# Patient Record
Sex: MALE | Race: WHITE | NOT HISPANIC OR LATINO | Employment: OTHER | URBAN - METROPOLITAN AREA
[De-identification: names, ages, dates, MRNs, and addresses within clinical notes are randomized per-mention and may not be internally consistent; named-entity substitution may affect disease eponyms.]

---

## 2020-07-18 ENCOUNTER — HOSPITAL ENCOUNTER (EMERGENCY)
Facility: HOSPITAL | Age: 35
Discharge: HOME | End: 2020-07-18
Admitting: EMERGENCY MEDICINE

## 2020-07-18 ENCOUNTER — APPOINTMENT (OUTPATIENT)
Dept: GENERAL RADIOLOGY | Facility: HOSPITAL | Age: 35
End: 2020-07-18

## 2020-07-18 VITALS
OXYGEN SATURATION: 100 % | DIASTOLIC BLOOD PRESSURE: 78 MMHG | RESPIRATION RATE: 18 BRPM | HEART RATE: 90 BPM | SYSTOLIC BLOOD PRESSURE: 148 MMHG | TEMPERATURE: 98.1 F | HEIGHT: 71 IN | BODY MASS INDEX: 18.86 KG/M2 | WEIGHT: 134.7 LBS

## 2020-07-18 DIAGNOSIS — B34.9 VIRAL SYNDROME: Primary | ICD-10-CM

## 2020-07-18 DIAGNOSIS — Z20.822 EXPOSURE TO COVID-19 VIRUS: ICD-10-CM

## 2020-07-18 DIAGNOSIS — R05.9 COUGH: ICD-10-CM

## 2020-07-18 PROCEDURE — U0004 COV-19 TEST NON-CDC HGH THRU: HCPCS | Performed by: NURSE PRACTITIONER

## 2020-07-18 PROCEDURE — C9803 HOPD COVID-19 SPEC COLLECT: HCPCS

## 2020-07-18 PROCEDURE — 71045 X-RAY EXAM CHEST 1 VIEW: CPT

## 2020-07-18 PROCEDURE — U0002 COVID-19 LAB TEST NON-CDC: HCPCS | Performed by: NURSE PRACTITIONER

## 2020-07-18 PROCEDURE — 99283 EMERGENCY DEPT VISIT LOW MDM: CPT

## 2020-07-20 ENCOUNTER — EPISODE CHANGES (OUTPATIENT)
Dept: CASE MANAGEMENT | Facility: OTHER | Age: 35
End: 2020-07-20

## 2020-07-20 LAB
REF LAB TEST METHOD: NORMAL
SARS-COV-2 RNA RESP QL NAA+PROBE: NOT DETECTED

## 2020-07-21 ENCOUNTER — EPISODE CHANGES (OUTPATIENT)
Dept: CASE MANAGEMENT | Facility: OTHER | Age: 35
End: 2020-07-21

## 2020-07-21 ENCOUNTER — PATIENT OUTREACH (OUTPATIENT)
Dept: CASE MANAGEMENT | Facility: OTHER | Age: 35
End: 2020-07-21

## 2020-07-21 NOTE — OUTREACH NOTE
ED Potential Covid Discharge Follow-up  Talked with patient. Discussed 7/18/20 ED visit regarding viral syndrome and exposure to COVID 19.  Patient awaiting COVID 19 test results. Patient compliant with ED recommendations and under quarantine until receives test results.    Patient reports symptoms of: Improvement regarding cough and has had episodes of SOB.  No difficulty with fever; chest pain; appetite or sleeping.  Patient reports no barriers in obtaining : food; medication and has transportation.    Patient lives with family; independent with ADL's; activities and transportation.   Reviewed with patient: ED recommendations; quarantine education; COVID precautions; hydration;  24/7 Nurse Triage Line; COVID 19 information telephone line; [facility] contact information [phone] (who may contact patient with COVID 19 results); ACM contact information;  My Chart; Telehealth appointment availability and establishing of PCP for follow up. Patient verbalized understanding. He states he will contact [facility] Department for test results and then call PCP for appointment with Dr. Ward Chavez as ED recommended. He declines RN-ACM assistance with PCP scheduling. Patient states to appreciate phone call and no further questions or concerns voiced at this time.     Elisabet Russ RN  Ambulatory     7/21/2020, 12:24

## 2020-07-27 ENCOUNTER — EPISODE CHANGES (OUTPATIENT)
Dept: CASE MANAGEMENT | Facility: OTHER | Age: 35
End: 2020-07-27

## 2020-08-17 ENCOUNTER — HOSPITAL ENCOUNTER (EMERGENCY)
Facility: HOSPITAL | Age: 35
Discharge: HOME | End: 2020-08-17
Admitting: EMERGENCY MEDICINE

## 2020-08-17 ENCOUNTER — APPOINTMENT (OUTPATIENT)
Dept: GENERAL RADIOLOGY | Facility: HOSPITAL | Age: 35
End: 2020-08-17

## 2020-08-17 VITALS
BODY MASS INDEX: 30.96 KG/M2 | OXYGEN SATURATION: 99 % | DIASTOLIC BLOOD PRESSURE: 97 MMHG | HEART RATE: 94 BPM | HEIGHT: 71 IN | WEIGHT: 221.12 LBS | RESPIRATION RATE: 16 BRPM | SYSTOLIC BLOOD PRESSURE: 154 MMHG | TEMPERATURE: 98.3 F

## 2020-08-17 DIAGNOSIS — Z20.822 COVID-19 RULED OUT BY LABORATORY TESTING: ICD-10-CM

## 2020-08-17 DIAGNOSIS — J40 BRONCHITIS: Primary | ICD-10-CM

## 2020-08-17 DIAGNOSIS — R05.9 COUGH: ICD-10-CM

## 2020-08-17 LAB — SARS-COV-2 RNA PNL SPEC NAA+PROBE: NOT DETECTED

## 2020-08-17 PROCEDURE — 93005 ELECTROCARDIOGRAM TRACING: CPT

## 2020-08-17 PROCEDURE — 99283 EMERGENCY DEPT VISIT LOW MDM: CPT

## 2020-08-17 PROCEDURE — 87635 SARS-COV-2 COVID-19 AMP PRB: CPT | Performed by: NURSE PRACTITIONER

## 2020-08-17 PROCEDURE — 71045 X-RAY EXAM CHEST 1 VIEW: CPT

## 2020-08-17 RX ORDER — BROMPHENIRAMINE MALEATE, PSEUDOEPHEDRINE HYDROCHLORIDE, AND DEXTROMETHORPHAN HYDROBROMIDE 2; 30; 10 MG/5ML; MG/5ML; MG/5ML
5 SYRUP ORAL 4 TIMES DAILY PRN
Qty: 120 ML | Refills: 0 | Status: SHIPPED | OUTPATIENT
Start: 2020-08-17 | End: 2020-09-04

## 2020-08-17 RX ORDER — DICLOFENAC SODIUM 75 MG/1
75 TABLET, DELAYED RELEASE ORAL 2 TIMES DAILY PRN
Qty: 20 TABLET | Refills: 0 | OUTPATIENT
Start: 2020-08-17 | End: 2020-09-04

## 2020-08-17 NOTE — ED PROVIDER NOTES
Subjective   Patient is a 35-year-old gentleman who states 3 weeks ago he tested negative for COVID but over the last 48 hours he states he has had cough and congestion he states that the cough is been so bad that he has had pain into his back.  He denies any hemoptysis.  He denies any fever or chills nausea or vomiting.  He states he is not been around anyone with COVID that he is aware of.  He rates his pain a 6/10 he states it is a dull aching pain and mainly only when he coughs.  He states he has had some productive yellow sputum over the last 12 hours          Review of Systems   Constitutional: Negative for chills, fatigue and fever.   HENT: Positive for congestion. Negative for tinnitus and trouble swallowing.    Eyes: Negative for photophobia, discharge and redness.   Respiratory: Positive for cough. Negative for shortness of breath.    Cardiovascular: Positive for chest pain. Negative for palpitations.   Gastrointestinal: Negative for abdominal pain, diarrhea, nausea and vomiting.   Genitourinary: Negative for dysuria, frequency and urgency.   Musculoskeletal: Negative for back pain, joint swelling and myalgias.   Skin: Negative for rash.   Neurological: Negative for dizziness and headaches.   Psychiatric/Behavioral: Negative for confusion.   All other systems reviewed and are negative.      History reviewed. No pertinent past medical history.    Allergies   Allergen Reactions   • Penicillins Swelling       History reviewed. No pertinent surgical history.    No family history on file.    Social History     Socioeconomic History   • Marital status: Single     Spouse name: Not on file   • Number of children: Not on file   • Years of education: Not on file   • Highest education level: Not on file           Objective   Physical Exam   Constitutional: He is oriented to person, place, and time. He appears well-developed and well-nourished.   HENT:   Head: Normocephalic and atraumatic.   Eyes: Pupils are equal,  "round, and reactive to light. Conjunctivae and EOM are normal.   Neck: Normal range of motion. Neck supple.   Cardiovascular: Normal rate, regular rhythm, normal heart sounds and intact distal pulses.   Pulmonary/Chest: Effort normal and breath sounds normal. He has no decreased breath sounds. He has no wheezes.   Abdominal: Soft. Bowel sounds are normal.   Musculoskeletal: Normal range of motion.   Neurological: He is alert and oriented to person, place, and time. GCS eye subscore is 4. GCS verbal subscore is 5. GCS motor subscore is 6.   Skin: Skin is warm and dry. Capillary refill takes less than 2 seconds.   Psychiatric: He has a normal mood and affect. His behavior is normal.   Vitals reviewed.      Procedures           ED Course      /97 (Patient Position: Sitting)   Pulse 94   Temp 98.1 °F (36.7 °C) (Oral)   Resp 18   Ht 180.3 cm (71\")   Wt 100 kg (221 lb 1.9 oz)   SpO2 100%   BMI 30.84 kg/m²   Labs Reviewed   COVID-19,ABBOTT IN-HOUSE,NP SWAB (NO TRANSPORT MEDIA) 2 HR TAT - Normal    Narrative:     Fact sheet for providers: https://www.fda.gov/media/048784/download     Fact sheet for patients: https://www.fda.gov/media/722805/download     Medications - No data to display       Chest x-ray was reviewed and found to be within normal limits                                MDM  Number of Diagnoses or Management Options  Bronchitis:   Cough:   COVID-19 ruled out by laboratory testing:   Diagnosis management comments: Patient had above exam and chest x-ray and COVID 19 testing was performed.  Patient was offered pain medication which he states he does not want.    Patient's COVID was negative again today and chest x-ray was within normal limits.  Patient will be sent home with some Bromfed and diclofenac and told to follow-up with primary care for any further problems return if worse patient verbalized understood discharge instructions       Amount and/or Complexity of Data Reviewed  Clinical lab tests: " reviewed  Tests in the radiology section of CPT®: reviewed  Tests in the medicine section of CPT®: reviewed  Independent visualization of images, tracings, or specimens: yes    Patient Progress  Patient progress: stable      Final diagnoses:   Bronchitis   Cough   COVID-19 ruled out by laboratory testing            Radha Strickland, APRN  08/17/20 0247

## 2020-08-17 NOTE — DISCHARGE INSTRUCTIONS
Use diclofenac as needed for discomfort    Bromfed for cough and congestion    Follow-up with primary care for any further problems return if worse    May also use Tylenol Motrin as needed

## 2020-09-05 ENCOUNTER — APPOINTMENT (OUTPATIENT)
Dept: GENERAL RADIOLOGY | Facility: HOSPITAL | Age: 35
End: 2020-09-05

## 2020-09-05 ENCOUNTER — APPOINTMENT (OUTPATIENT)
Dept: CT IMAGING | Facility: HOSPITAL | Age: 35
End: 2020-09-05

## 2020-09-05 ENCOUNTER — HOSPITAL ENCOUNTER (EMERGENCY)
Facility: HOSPITAL | Age: 35
Discharge: HOME | End: 2020-09-05
Admitting: EMERGENCY MEDICINE

## 2020-09-05 VITALS
RESPIRATION RATE: 16 BRPM | WEIGHT: 220.46 LBS | TEMPERATURE: 98.2 F | HEIGHT: 71 IN | BODY MASS INDEX: 30.86 KG/M2 | OXYGEN SATURATION: 98 % | SYSTOLIC BLOOD PRESSURE: 131 MMHG | DIASTOLIC BLOOD PRESSURE: 81 MMHG | HEART RATE: 88 BPM

## 2020-09-05 DIAGNOSIS — R10.13 EPIGASTRIC PAIN: Primary | ICD-10-CM

## 2020-09-05 DIAGNOSIS — R05.9 COUGH: ICD-10-CM

## 2020-09-05 DIAGNOSIS — R11.0 NAUSEA: ICD-10-CM

## 2020-09-05 DIAGNOSIS — K21.9 GASTROESOPHAGEAL REFLUX DISEASE, ESOPHAGITIS PRESENCE NOT SPECIFIED: ICD-10-CM

## 2020-09-05 LAB
ALBUMIN SERPL-MCNC: 4.8 G/DL (ref 3.5–5.2)
ALBUMIN/GLOB SERPL: 1.9 G/DL
ALP SERPL-CCNC: 127 U/L (ref 39–117)
ALT SERPL W P-5'-P-CCNC: 41 U/L (ref 1–41)
ANION GAP SERPL CALCULATED.3IONS-SCNC: 10 MMOL/L (ref 5–15)
AST SERPL-CCNC: 24 U/L (ref 1–40)
BASOPHILS # BLD AUTO: 0.1 10*3/MM3 (ref 0–0.2)
BASOPHILS NFR BLD AUTO: 0.6 % (ref 0–1.5)
BILIRUB SERPL-MCNC: 0.5 MG/DL (ref 0–1.2)
BILIRUB UR QL STRIP: NEGATIVE
BUN SERPL-MCNC: 12 MG/DL (ref 6–20)
BUN SERPL-MCNC: ABNORMAL MG/DL
BUN/CREAT SERPL: ABNORMAL
CALCIUM SPEC-SCNC: 8.9 MG/DL (ref 8.6–10.5)
CHLORIDE SERPL-SCNC: 101 MMOL/L (ref 98–107)
CLARITY UR: CLEAR
CO2 SERPL-SCNC: 27 MMOL/L (ref 22–29)
COLOR UR: YELLOW
CREAT SERPL-MCNC: 0.85 MG/DL (ref 0.76–1.27)
DEPRECATED RDW RBC AUTO: 40.3 FL (ref 37–54)
EOSINOPHIL # BLD AUTO: 0.2 10*3/MM3 (ref 0–0.4)
EOSINOPHIL NFR BLD AUTO: 2.4 % (ref 0.3–6.2)
ERYTHROCYTE [DISTWIDTH] IN BLOOD BY AUTOMATED COUNT: 12.5 % (ref 12.3–15.4)
GFR SERPL CREATININE-BSD FRML MDRD: 101 ML/MIN/1.73
GLOBULIN UR ELPH-MCNC: 2.5 GM/DL
GLUCOSE SERPL-MCNC: 97 MG/DL (ref 65–99)
GLUCOSE UR STRIP-MCNC: NEGATIVE MG/DL
HCT VFR BLD AUTO: 45.6 % (ref 37.5–51)
HGB BLD-MCNC: 16.3 G/DL (ref 13–17.7)
HGB UR QL STRIP.AUTO: NEGATIVE
HOLD SPECIMEN: NORMAL
HOLD SPECIMEN: NORMAL
KETONES UR QL STRIP: NEGATIVE
LEUKOCYTE ESTERASE UR QL STRIP.AUTO: NEGATIVE
LIPASE SERPL-CCNC: 26 U/L (ref 13–60)
LYMPHOCYTES # BLD AUTO: 2.6 10*3/MM3 (ref 0.7–3.1)
LYMPHOCYTES NFR BLD AUTO: 27 % (ref 19.6–45.3)
MCH RBC QN AUTO: 33.2 PG (ref 26.6–33)
MCHC RBC AUTO-ENTMCNC: 35.8 G/DL (ref 31.5–35.7)
MCV RBC AUTO: 92.8 FL (ref 79–97)
MONOCYTES # BLD AUTO: 0.9 10*3/MM3 (ref 0.1–0.9)
MONOCYTES NFR BLD AUTO: 9 % (ref 5–12)
NEUTROPHILS NFR BLD AUTO: 6 10*3/MM3 (ref 1.7–7)
NEUTROPHILS NFR BLD AUTO: 61 % (ref 42.7–76)
NITRITE UR QL STRIP: NEGATIVE
NRBC BLD AUTO-RTO: 0 /100 WBC (ref 0–0.2)
PH UR STRIP.AUTO: 8 [PH] (ref 5–8)
PLATELET # BLD AUTO: 342 10*3/MM3 (ref 140–450)
PMV BLD AUTO: 6.7 FL (ref 6–12)
POTASSIUM SERPL-SCNC: 3.9 MMOL/L (ref 3.5–5.2)
PROT SERPL-MCNC: 7.3 G/DL (ref 6–8.5)
PROT UR QL STRIP: ABNORMAL
RBC # BLD AUTO: 4.91 10*6/MM3 (ref 4.14–5.8)
SODIUM SERPL-SCNC: 138 MMOL/L (ref 136–145)
SP GR UR STRIP: >=1.03 (ref 1–1.03)
TROPONIN T SERPL-MCNC: <0.01 NG/ML (ref 0–0.03)
UROBILINOGEN UR QL STRIP: ABNORMAL
WBC # BLD AUTO: 9.8 10*3/MM3 (ref 3.4–10.8)
WHOLE BLOOD HOLD SPECIMEN: NORMAL
WHOLE BLOOD HOLD SPECIMEN: NORMAL

## 2020-09-05 PROCEDURE — 93005 ELECTROCARDIOGRAM TRACING: CPT | Performed by: NURSE PRACTITIONER

## 2020-09-05 PROCEDURE — 99283 EMERGENCY DEPT VISIT LOW MDM: CPT

## 2020-09-05 PROCEDURE — 0 IOPAMIDOL PER 1 ML: Performed by: NURSE PRACTITIONER

## 2020-09-05 PROCEDURE — 84484 ASSAY OF TROPONIN QUANT: CPT | Performed by: NURSE PRACTITIONER

## 2020-09-05 PROCEDURE — 83690 ASSAY OF LIPASE: CPT | Performed by: NURSE PRACTITIONER

## 2020-09-05 PROCEDURE — 81003 URINALYSIS AUTO W/O SCOPE: CPT | Performed by: NURSE PRACTITIONER

## 2020-09-05 PROCEDURE — 74177 CT ABD & PELVIS W/CONTRAST: CPT

## 2020-09-05 PROCEDURE — 80053 COMPREHEN METABOLIC PANEL: CPT | Performed by: NURSE PRACTITIONER

## 2020-09-05 PROCEDURE — 71045 X-RAY EXAM CHEST 1 VIEW: CPT

## 2020-09-05 PROCEDURE — 96374 THER/PROPH/DIAG INJ IV PUSH: CPT

## 2020-09-05 PROCEDURE — 85025 COMPLETE CBC W/AUTO DIFF WBC: CPT | Performed by: NURSE PRACTITIONER

## 2020-09-05 RX ORDER — SODIUM CHLORIDE 0.9 % (FLUSH) 0.9 %
10 SYRINGE (ML) INJECTION AS NEEDED
Status: DISCONTINUED | OUTPATIENT
Start: 2020-09-05 | End: 2020-09-05 | Stop reason: HOSPADM

## 2020-09-05 RX ORDER — PANTOPRAZOLE SODIUM 40 MG/10ML
80 INJECTION, POWDER, LYOPHILIZED, FOR SOLUTION INTRAVENOUS ONCE
Status: COMPLETED | OUTPATIENT
Start: 2020-09-05 | End: 2020-09-05

## 2020-09-05 RX ADMIN — IOPAMIDOL 100 ML: 755 INJECTION, SOLUTION INTRAVENOUS at 15:40

## 2020-09-05 RX ADMIN — PANTOPRAZOLE SODIUM 80 MG: 40 INJECTION, POWDER, FOR SOLUTION INTRAVENOUS at 15:02

## 2020-09-05 NOTE — DISCHARGE INSTRUCTIONS
Patient to try over-the-counter omeprazole use as directed on the bottle he is to follow-up with his family doctor and get referred to GI specialist

## 2020-09-05 NOTE — ED PROVIDER NOTES
Subjective   Patient is a 35-year-old white male comes in with complaints of midepigastric abdominal pain goes to the right side flank area denies any urinary symptoms states bowels are moving okay states was seen by the immediate care and was placed on doxycycline but has not started this for cough coughing up brown stuff denies any fever states he gets worse when he lays down and gets a little dizzy he is allergic to penicillin no fever      History provided by:  Patient  Abdominal Pain   Pain location:  Epigastric and R flank  Pain quality: aching    Pain radiates to:  R flank  Pain severity:  Moderate  Onset quality:  Gradual  Duration:  1 week  Timing:  Constant  Progression:  Worsening  Chronicity:  New  Context: recent illness    Context: not alcohol use, not awakening from sleep, not diet changes, not eating, not laxative use, not medication withdrawal, not previous surgeries, not retching, not sick contacts, not suspicious food intake and not trauma    Relieved by:  Nothing  Worsened by:  Palpation, movement and position changes  Ineffective treatments:  NSAIDs  Associated symptoms: chest pain, cough, nausea and shortness of breath    Associated symptoms: no anorexia, no belching, no chills, no constipation, no diarrhea, no dysuria, no fatigue, no fever, no flatus, no hematemesis, no hematochezia, no hematuria, no melena, no sore throat and no vomiting    Cough:     Cough characteristics:  Productive    Sputum characteristics:  Green and brown    Severity:  Mild    Onset quality:  Gradual  Risk factors: NSAID use and obesity        Review of Systems   Constitutional: Negative for activity change, appetite change, chills, fatigue and fever.   HENT: Negative for congestion, sore throat and trouble swallowing.    Eyes: Negative for discharge and redness.   Respiratory: Positive for cough and shortness of breath. Negative for apnea, chest tightness and stridor.    Cardiovascular: Positive for chest pain.  Negative for palpitations and leg swelling.   Gastrointestinal: Positive for abdominal pain and nausea. Negative for abdominal distention, anal bleeding, anorexia, blood in stool, constipation, diarrhea, flatus, hematemesis, hematochezia, melena and vomiting.   Genitourinary: Positive for flank pain. Negative for dysuria and hematuria.   Musculoskeletal: Negative for back pain, joint swelling and neck pain.   Skin: Negative for color change, pallor and rash.   Neurological: Negative for dizziness and numbness.   Psychiatric/Behavioral: The patient is nervous/anxious.        History reviewed. No pertinent past medical history.    Allergies   Allergen Reactions   • Penicillins Swelling       History reviewed. No pertinent surgical history.    No family history on file.    Social History     Socioeconomic History   • Marital status: Single     Spouse name: Not on file   • Number of children: Not on file   • Years of education: Not on file   • Highest education level: Not on file           Objective   Physical Exam   Constitutional: He is oriented to person, place, and time. He appears well-developed and well-nourished. No distress.   HENT:   Head: Normocephalic and atraumatic.   Eyes: Pupils are equal, round, and reactive to light. Conjunctivae and EOM are normal.   Neck: Normal range of motion. Neck supple.   Cardiovascular: Normal rate, regular rhythm, normal heart sounds and intact distal pulses. Exam reveals no gallop and no friction rub.   No murmur heard.  Pulmonary/Chest: Effort normal and breath sounds normal. No respiratory distress. He has no wheezes. He has no rales. He exhibits no tenderness.   Abdominal: Soft. Bowel sounds are normal. He exhibits no distension. There is no hepatosplenomegaly. There is tenderness in the right upper quadrant, epigastric area and periumbilical area. There is tenderness at McBurney's point. There is no rigidity, no rebound, no guarding, no CVA tenderness and negative Lr's  sign.   Musculoskeletal: Normal range of motion.   Neurological: He is alert and oriented to person, place, and time.   Skin: Skin is warm and dry. No rash noted. He is not diaphoretic.   Psychiatric: He has a normal mood and affect. His behavior is normal. Judgment and thought content normal.   Nursing note and vitals reviewed.      Procedures           ED Course  ED Course as of Sep 05 1622   Sat Sep 05, 2020   1502 CBC white count normal CMP normal urine normal lipase normal troponin 0.0 1L normal    []   1504 EKG read by  normal sinus rhythm no acute changes    []   1618 CT scan of abdomen normal    []      ED Course User Index  [] Марина Villanueva, TATUM      Ct Abdomen Pelvis With Contrast    Result Date: 9/5/2020  No acute findings.  Electronically Signed By-Michele Muller On:9/5/2020 4:15 PM This report was finalized on [id number] by  Michele Muller, .    Xr Chest 1 View    Result Date: 9/5/2020  No active disease.  Electronically Signed By-Michele Muller On:9/5/2020 3:09 PM This report was finalized on [id number] by  Michele Muller, .    Medications   sodium chloride 0.9 % flush 10 mL (has no administration in time range)   pantoprazole (PROTONIX) injection 80 mg (80 mg Intravenous Given 9/5/20 1502)   iopamidol (ISOVUE-370) 76 % injection 100 mL (100 mL Intravenous Given 9/5/20 1540)     Labs Reviewed   COMPREHENSIVE METABOLIC PANEL - Abnormal; Notable for the following components:       Result Value    Alkaline Phosphatase 127 (*)     All other components within normal limits    Narrative:     GFR Normal >60  Chronic Kidney Disease <60  Kidney Failure <15     URINALYSIS W/ MICROSCOPIC IF INDICATED (NO CULTURE) - Abnormal; Notable for the following components:    Protein, UA Trace (*)     All other components within normal limits    Narrative:     Urine microscopic not indicated.   CBC WITH AUTO DIFFERENTIAL - Abnormal; Notable for the following components:    MCH 33.2 (*)     MCHC 35.8 (*)     All  other components within normal limits   LIPASE - Normal   TROPONIN (IN-HOUSE) - Normal    Narrative:     Troponin T Reference Range:  <= 0.03 ng/mL-   Negative for AMI  >0.03 ng/mL-     Abnormal for myocardial necrosis.  Clinicians would have to utilize clinical acumen, EKG, Troponin and serial changes to determine if it is an Acute Myocardial Infarction or myocardial injury due to an underlying chronic condition.       Results may be falsely decreased if patient taking Biotin.     BUN - Normal   RAINBOW DRAW    Narrative:     The following orders were created for panel order [place] Draw.  Procedure                               Abnormality         Status                     ---------                               -----------         ------                     Light Blue Top[[id number]]                                   Final result               Green Top (Gel)[[id number]]                                  Final result               Lavender Top[[id number]]                                     Final result               Gold Top - SST[[id number]]                                   Final result                 Please view results for these tests on the individual orders.   CBC AND DIFFERENTIAL    Narrative:     The following orders were created for panel order CBC & Differential.  Procedure                               Abnormality         Status                     ---------                               -----------         ------                     CBC Auto Differential[[id number]]        Abnormal            Final result                 Please view results for these tests on the individual orders.   LIGHT BLUE TOP   GREEN TOP   LAVENDER TOP   GOLD TOP - SST                                          MDM  Number of Diagnoses or Management Options  Cough:   Epigastric pain:   Gastroesophageal reflux disease, esophagitis presence not specified:   Nausea:   Diagnosis management comments: Disposition: Discharged.    Patient  discharged in stable condition.    Reviewed implications of results, diagnosis, meds, responsibility to follow up, warning signs and symptoms of possible worsening, potential complications and reasons to return to ER    Patient/Family voiced understanding of above instructions.    Discussed plan for discharge, as there is no emergent indication for admission.  Pt/family is agreeable and understands need for follow up and repeat testing.  Pt is aware that discharge does not mean that nothing is wrong but it indicates no emergency is present and they must continue care with follow-up as given below or physician of their choice.     FOLLOW-UP  Moiz Arambula  [facility] [address] [phone] an appointment as soon as possible for a visit If symptoms worsen  [facility] EMERGENCY [id number] [place] [address] [phone] symptoms worsen       Medication List      No changes were made to your prescriptions during this visit.            Amount and/or Complexity of Data Reviewed  Clinical lab tests: reviewed  Tests in the radiology section of CPT®: reviewed  Tests in the medicine section of CPT®: reviewed        Final diagnoses:   Epigastric pain   Nausea   Cough   Gastroesophageal reflux disease, esophagitis presence not specified            Марина Villanueva, APRN  09/05/20 5366

## 2020-10-20 ENCOUNTER — OFFICE VISIT (OUTPATIENT)
Dept: FAMILY MEDICINE CLINIC | Facility: CLINIC | Age: 35
End: 2020-10-20

## 2020-10-20 VITALS
SYSTOLIC BLOOD PRESSURE: 124 MMHG | WEIGHT: 226 LBS | DIASTOLIC BLOOD PRESSURE: 88 MMHG | HEIGHT: 71 IN | HEART RATE: 95 BPM | TEMPERATURE: 96.6 F | RESPIRATION RATE: 16 BRPM | OXYGEN SATURATION: 97 % | BODY MASS INDEX: 31.64 KG/M2

## 2020-10-20 DIAGNOSIS — R10.13 EPIGASTRIC PAIN: Primary | ICD-10-CM

## 2020-10-20 DIAGNOSIS — R13.19 ESOPHAGEAL DYSPHAGIA: ICD-10-CM

## 2020-10-20 PROCEDURE — 99204 OFFICE O/P NEW MOD 45 MIN: CPT | Performed by: PHYSICIAN ASSISTANT

## 2020-10-20 RX ORDER — PANTOPRAZOLE SODIUM 40 MG/1
40 TABLET, DELAYED RELEASE ORAL DAILY
Qty: 45 TABLET | Refills: 0 | Status: SHIPPED | OUTPATIENT
Start: 2020-10-20 | End: 2022-10-10

## 2020-10-20 NOTE — PROGRESS NOTES
Rooming Tab(CC,VS,Pt Hx,Fall Screen)  Chief Complaint   Patient presents with   • Abdominal Pain       Subjective  Patient presents to the clinic with complaints of epigastric abdominal pain that has been present over the past 1 to 2 weeks.  He has complained of abdominal bloating that has been present over the past 1 year.  He denies having any vomiting, fevers, diarrhea associated with the abdominal pain.  He did go to the emergency department and had a CT scan performed that did not show any acute process.  He had labs that were taken there revealed a slightly elevated alkaline phosphatase but other than that his labs were unremarkable.  He denies having pain like this in the past.  He denies dark-colored stools.  He does not notice any association with food intake.  He does take ibuprofen on a daily basis and has been doing this for quite some time for joint pain and headaches.  He only drinks 1-2 beers per month.  He is an everyday smoker.  No history of pancreatic issues.  No family history of pancreatic or stomach cancer.  He also complains that on occasion he will feel like his food gets stuck in his esophagus and he is to drink extra water to get this to go down.    I have reviewed and updated his medications, medical history and problem list during today's office visit.     Patient Care Team:  Jc Khan PA-C as PCP - General (Physician Assistant)  David Martinez MD as PCP - Claims Attributed    Problem List Tab  Medications Tab  Synopsis Tab  Chart Review Tab  Care Everywhere Tab  Immunizations Tab  Patient History Tab    Social History     Tobacco Use   • Smoking status: Current Every Day Smoker   • Smokeless tobacco: Never Used   Substance Use Topics   • Alcohol use: Yes       Review of Systems   Constitutional: Negative for activity change, appetite change and fatigue.   HENT: Negative for congestion, rhinorrhea and sore throat.    Eyes: Negative for blurred vision, pain and visual  "disturbance.   Respiratory: Negative for cough, chest tightness and shortness of breath.    Cardiovascular: Negative for chest pain and leg swelling.   Gastrointestinal: Positive for abdominal distention and abdominal pain. Negative for blood in stool and nausea.   Endocrine: Negative for polydipsia and polyuria.   Genitourinary: Negative for dysuria and urgency.   Musculoskeletal: Negative for arthralgias and back pain.   Skin: Negative for rash and bruise.   Allergic/Immunologic: Negative for environmental allergies.   Neurological: Negative for headache and confusion.   Hematological: Negative for adenopathy. Does not bruise/bleed easily.   Psychiatric/Behavioral: Negative for stress. The patient is not nervous/anxious.        Objective     Rooming Tab(CC,VS,Pt Hx,Fall Screen)  /88 (BP Location: Right arm)   Pulse 95   Temp 96.6 °F (35.9 °C)   Resp 16   Ht 180.3 cm (71\")   Wt 103 kg (226 lb)   SpO2 97%   BMI 31.52 kg/m²     Body mass index is 31.52 kg/m².    Physical Exam  Constitutional:       Appearance: He is well-developed.   HENT:      Head: Normocephalic and atraumatic.   Eyes:      Pupils: Pupils are equal, round, and reactive to light.   Neck:      Musculoskeletal: Normal range of motion and neck supple.   Cardiovascular:      Rate and Rhythm: Normal rate and regular rhythm.      Heart sounds: No murmur.   Pulmonary:      Effort: Pulmonary effort is normal.      Breath sounds: Normal breath sounds.   Abdominal:      General: Bowel sounds are normal.      Palpations: Abdomen is soft.      Tenderness: There is abdominal tenderness (pain in the epigastric region).   Musculoskeletal: Normal range of motion.         General: No deformity.   Lymphadenopathy:      Cervical: No cervical adenopathy.   Skin:     General: Skin is warm and dry.      Capillary Refill: Capillary refill takes less than 2 seconds.   Neurological:      Mental Status: He is alert and oriented to person, place, and time.      " Cranial Nerves: No cranial nerve deficit.   Psychiatric:         Behavior: Behavior normal.          Statin Choice Calculator  Data Reviewed:             Lab Results   Component Value Date    BUN  09/05/2020      Comment:      Testing performed by alternate method    BUN 12 09/05/2020    CREATININE 0.85 09/05/2020    EGFRIFNONA 101 09/05/2020     09/05/2020    K 3.9 09/05/2020     09/05/2020    CALCIUM 8.9 09/05/2020    ALBUMIN 4.80 09/05/2020    BILITOT 0.5 09/05/2020    ALKPHOS 127 (H) 09/05/2020    AST 24 09/05/2020    ALT 41 09/05/2020    WBC 9.80 09/05/2020    RBC 4.91 09/05/2020    HCT 45.6 09/05/2020    MCV 92.8 09/05/2020    MCH 33.2 (H) 09/05/2020      Assessment/Plan   Order Review Tab  Health Maintenance Tab  Patient Plan/Order Tab  Diagnoses and all orders for this visit:    1. Epigastric pain (Primary)  -     Ambulatory Referral to Gastroenterology    2. Esophageal dysphagia  -     Ambulatory Referral to Gastroenterology    Other orders  -     pantoprazole (Protonix) 40 MG EC tablet; Take 1 tablet by mouth Daily.  Dispense: 45 tablet; Refill: 0        Wrapup Tab  Return in about 3 months (around 1/20/2021), or if symptoms worsen or fail to improve, for Recheck.

## 2021-04-18 ENCOUNTER — APPOINTMENT (OUTPATIENT)
Dept: GENERAL RADIOLOGY | Facility: HOSPITAL | Age: 36
End: 2021-04-18

## 2021-04-18 ENCOUNTER — HOSPITAL ENCOUNTER (EMERGENCY)
Facility: HOSPITAL | Age: 36
Discharge: HOME | End: 2021-04-19
Attending: EMERGENCY MEDICINE | Admitting: EMERGENCY MEDICINE

## 2021-04-18 VITALS
SYSTOLIC BLOOD PRESSURE: 165 MMHG | BODY MASS INDEX: 32.56 KG/M2 | HEART RATE: 115 BPM | WEIGHT: 232.59 LBS | RESPIRATION RATE: 18 BRPM | HEIGHT: 71 IN | TEMPERATURE: 98 F | OXYGEN SATURATION: 100 % | DIASTOLIC BLOOD PRESSURE: 70 MMHG

## 2021-04-18 DIAGNOSIS — R07.89 CHEST WALL PAIN: Primary | ICD-10-CM

## 2021-04-18 PROCEDURE — 99282 EMERGENCY DEPT VISIT SF MDM: CPT

## 2021-04-18 PROCEDURE — 71046 X-RAY EXAM CHEST 2 VIEWS: CPT

## 2021-04-19 NOTE — ED PROVIDER NOTES
Subjective   36-year-old male was swinging a 2 x 4 around 4 PM and felt a catch in his right inferior lateral ribs.  There is no blunt trauma but patient has had pain with movement and deep breath that has been moderate in intensity since this event.  Patient has a mild associated cough no fever or shortness of air.          Review of Systems   Constitutional: Negative.    Respiratory: Positive for cough.    Cardiovascular: Positive for chest pain.   Neurological: Negative.        Past Medical History:   Diagnosis Date   • Anxiety        Allergies   Allergen Reactions   • Penicillins Swelling       Past Surgical History:   Procedure Laterality Date   • TESTICLE SURGERY Right        Family History   Problem Relation Age of Onset   • COPD Mother    • Heart failure Mother    • Arthritis Mother    • COPD Father    • Heart failure Father    • Heart disease Sister        Social History     Socioeconomic History   • Marital status: Single     Spouse name: Not on file   • Number of children: Not on file   • Years of education: Not on file   • Highest education level: Not on file   Tobacco Use   • Smoking status: Current Every Day Smoker   • Smokeless tobacco: Never Used   Substance and Sexual Activity   • Alcohol use: Yes   • Drug use: Never           Objective   Physical Exam  Constitutional:       Appearance: Normal appearance.   HENT:      Head: Normocephalic and atraumatic.   Cardiovascular:      Rate and Rhythm: Normal rate and regular rhythm.      Heart sounds: Normal heart sounds.   Pulmonary:      Effort: Pulmonary effort is normal.      Breath sounds: Normal breath sounds.      Comments: There is mild rib tenderness right lateral inferior costal margin, no overlying crepitus or abrasion or ecchymosis  Abdominal:      General: Bowel sounds are normal. There is no distension.      Palpations: Abdomen is soft.      Tenderness: There is no abdominal tenderness.   Musculoskeletal:         General: No tenderness. Normal  range of motion.   Skin:     General: Skin is warm and dry.      Capillary Refill: Capillary refill takes less than 2 seconds.   Neurological:      General: No focal deficit present.      Mental Status: He is alert and oriented to person, place, and time.   Psychiatric:         Mood and Affect: Mood normal.         Behavior: Behavior normal.         Procedures           ED Course                                           MDM    Final diagnoses:   Chest wall pain       ED Disposition  ED Disposition     ED Disposition Condition Comment    Discharge Stable           Jc Khan PA-C  [address] [facility] [address]  [facility] [address]  [place] [address]  [phone]      As needed         Medication List      No changes were made to your prescriptions during this visit.          Indio Dudley MD  04/19/21 0046

## 2021-04-19 NOTE — ED NOTES
Pt reports swinging boards over his head earlier today and experienced a sharp pain to right lower chest/ upper abdominal area. Painful to use right arm over the head, coughing, deep breathing. Pt concerned because he has had abdominal issues and is scheduled for an EGD.     Alma Rosa Jones, RN  04/18/21 9622

## 2021-05-18 PROBLEM — H81.10 BENIGN PAROXYSMAL POSITIONAL VERTIGO: Status: ACTIVE | Noted: 2019-01-07

## 2021-05-18 PROBLEM — H69.81 EUSTACHIAN TUBE DYSFUNCTION, RIGHT: Status: ACTIVE | Noted: 2019-01-07

## 2021-05-18 PROBLEM — J20.9 ACUTE BRONCHITIS WITH BRONCHOSPASM: Status: ACTIVE | Noted: 2017-04-05

## 2021-05-18 PROBLEM — J32.9 SINUSITIS: Status: ACTIVE | Noted: 2019-01-07

## 2021-05-18 PROBLEM — H69.91 EUSTACHIAN TUBE DYSFUNCTION, RIGHT: Status: ACTIVE | Noted: 2019-01-07

## 2021-05-26 ENCOUNTER — HOSPITAL ENCOUNTER (EMERGENCY)
Facility: HOSPITAL | Age: 36
Discharge: HOME | End: 2021-05-26
Attending: EMERGENCY MEDICINE | Admitting: EMERGENCY MEDICINE

## 2021-05-26 ENCOUNTER — APPOINTMENT (OUTPATIENT)
Dept: GENERAL RADIOLOGY | Facility: HOSPITAL | Age: 36
End: 2021-05-26

## 2021-05-26 VITALS
HEART RATE: 72 BPM | SYSTOLIC BLOOD PRESSURE: 119 MMHG | TEMPERATURE: 98.1 F | WEIGHT: 225.09 LBS | OXYGEN SATURATION: 94 % | RESPIRATION RATE: 16 BRPM | BODY MASS INDEX: 31.51 KG/M2 | HEIGHT: 71 IN | DIASTOLIC BLOOD PRESSURE: 60 MMHG

## 2021-05-26 DIAGNOSIS — R07.9 CHEST PAIN, UNSPECIFIED TYPE: ICD-10-CM

## 2021-05-26 DIAGNOSIS — J20.9 ACUTE BRONCHITIS, UNSPECIFIED ORGANISM: Primary | ICD-10-CM

## 2021-05-26 LAB
ANION GAP SERPL CALCULATED.3IONS-SCNC: 14 MMOL/L (ref 5–15)
BASOPHILS # BLD AUTO: 0 10*3/MM3 (ref 0–0.2)
BASOPHILS NFR BLD AUTO: 0.5 % (ref 0–1.5)
BUN SERPL-MCNC: 12 MG/DL (ref 6–20)
BUN/CREAT SERPL: 14.3 (ref 7–25)
CALCIUM SPEC-SCNC: 8.6 MG/DL (ref 8.6–10.5)
CHLORIDE SERPL-SCNC: 104 MMOL/L (ref 98–107)
CO2 SERPL-SCNC: 22 MMOL/L (ref 22–29)
CREAT SERPL-MCNC: 0.84 MG/DL (ref 0.76–1.27)
DEPRECATED RDW RBC AUTO: 41.1 FL (ref 37–54)
EOSINOPHIL # BLD AUTO: 0.2 10*3/MM3 (ref 0–0.4)
EOSINOPHIL NFR BLD AUTO: 2.8 % (ref 0.3–6.2)
ERYTHROCYTE [DISTWIDTH] IN BLOOD BY AUTOMATED COUNT: 12.6 % (ref 12.3–15.4)
GFR SERPL CREATININE-BSD FRML MDRD: 102 ML/MIN/1.73
GLUCOSE SERPL-MCNC: 123 MG/DL (ref 65–99)
HCT VFR BLD AUTO: 43.3 % (ref 37.5–51)
HGB BLD-MCNC: 15.3 G/DL (ref 13–17.7)
LYMPHOCYTES # BLD AUTO: 2.5 10*3/MM3 (ref 0.7–3.1)
LYMPHOCYTES NFR BLD AUTO: 28.8 % (ref 19.6–45.3)
MCH RBC QN AUTO: 33 PG (ref 26.6–33)
MCHC RBC AUTO-ENTMCNC: 35.3 G/DL (ref 31.5–35.7)
MCV RBC AUTO: 93.4 FL (ref 79–97)
MONOCYTES # BLD AUTO: 0.7 10*3/MM3 (ref 0.1–0.9)
MONOCYTES NFR BLD AUTO: 8.1 % (ref 5–12)
NEUTROPHILS NFR BLD AUTO: 5.3 10*3/MM3 (ref 1.7–7)
NEUTROPHILS NFR BLD AUTO: 59.8 % (ref 42.7–76)
NRBC BLD AUTO-RTO: 0.1 /100 WBC (ref 0–0.2)
PLATELET # BLD AUTO: 308 10*3/MM3 (ref 140–450)
PMV BLD AUTO: 6.8 FL (ref 6–12)
POTASSIUM SERPL-SCNC: 3.8 MMOL/L (ref 3.5–5.2)
RBC # BLD AUTO: 4.64 10*6/MM3 (ref 4.14–5.8)
SARS-COV-2 RNA PNL SPEC NAA+PROBE: NORMAL
SODIUM SERPL-SCNC: 140 MMOL/L (ref 136–145)
TROPONIN T SERPL-MCNC: <0.01 NG/ML (ref 0–0.03)
WBC # BLD AUTO: 8.8 10*3/MM3 (ref 3.4–10.8)

## 2021-05-26 PROCEDURE — 84484 ASSAY OF TROPONIN QUANT: CPT | Performed by: EMERGENCY MEDICINE

## 2021-05-26 PROCEDURE — 80048 BASIC METABOLIC PNL TOTAL CA: CPT | Performed by: EMERGENCY MEDICINE

## 2021-05-26 PROCEDURE — 85025 COMPLETE CBC W/AUTO DIFF WBC: CPT | Performed by: EMERGENCY MEDICINE

## 2021-05-26 PROCEDURE — 71045 X-RAY EXAM CHEST 1 VIEW: CPT

## 2021-05-26 PROCEDURE — 87635 SARS-COV-2 COVID-19 AMP PRB: CPT | Performed by: EMERGENCY MEDICINE

## 2021-05-26 PROCEDURE — 99283 EMERGENCY DEPT VISIT LOW MDM: CPT

## 2021-05-26 PROCEDURE — 93005 ELECTROCARDIOGRAM TRACING: CPT | Performed by: EMERGENCY MEDICINE

## 2021-05-26 RX ORDER — PREDNISONE 20 MG/1
60 TABLET ORAL DAILY
Qty: 15 TABLET | Refills: 0 | Status: SHIPPED | OUTPATIENT
Start: 2021-05-26 | End: 2021-05-31

## 2021-05-26 RX ORDER — AZITHROMYCIN 250 MG/1
TABLET, FILM COATED ORAL
Qty: 6 TABLET | Refills: 0 | Status: SHIPPED | OUTPATIENT
Start: 2021-05-26 | End: 2022-10-10

## 2021-05-26 RX ORDER — SODIUM CHLORIDE 0.9 % (FLUSH) 0.9 %
10 SYRINGE (ML) INJECTION AS NEEDED
Status: DISCONTINUED | OUTPATIENT
Start: 2021-05-26 | End: 2021-05-26 | Stop reason: HOSPADM

## 2021-05-30 LAB — QT INTERVAL: 357 MS

## 2021-08-15 ENCOUNTER — HOSPITAL ENCOUNTER (EMERGENCY)
Facility: HOSPITAL | Age: 36
Discharge: LEFT BEFORE DISCHARGE | End: 2021-08-15

## 2021-08-15 VITALS
HEART RATE: 110 BPM | HEIGHT: 71 IN | TEMPERATURE: 99.4 F | DIASTOLIC BLOOD PRESSURE: 83 MMHG | WEIGHT: 227 LBS | BODY MASS INDEX: 31.78 KG/M2 | SYSTOLIC BLOOD PRESSURE: 152 MMHG | OXYGEN SATURATION: 95 % | RESPIRATION RATE: 20 BRPM

## 2021-08-15 LAB — SARS-COV-2 RNA PNL SPEC NAA+PROBE: NOT DETECTED

## 2021-08-15 PROCEDURE — U0005 INFEC AGEN DETEC AMPLI PROBE: HCPCS

## 2021-08-15 PROCEDURE — U0003 INFECTIOUS AGENT DETECTION BY NUCLEIC ACID (DNA OR RNA); SEVERE ACUTE RESPIRATORY SYNDROME CORONAVIRUS 2 (SARS-COV-2) (CORONAVIRUS DISEASE [COVID-19]), AMPLIFIED PROBE TECHNIQUE, MAKING USE OF HIGH THROUGHPUT TECHNOLOGIES AS DESCRIBED BY CMS-2020-01-R: HCPCS

## 2021-08-15 PROCEDURE — 99211 OFF/OP EST MAY X REQ PHY/QHP: CPT

## 2021-10-26 ENCOUNTER — HOSPITAL ENCOUNTER (EMERGENCY)
Facility: HOSPITAL | Age: 36
Discharge: HOME | End: 2021-10-27
Attending: EMERGENCY MEDICINE

## 2021-10-26 DIAGNOSIS — R07.9 CHEST PAIN, UNSPECIFIED TYPE: Primary | ICD-10-CM

## 2021-10-26 PROCEDURE — 93005 ELECTROCARDIOGRAM TRACING: CPT

## 2021-10-26 PROCEDURE — 99283 EMERGENCY DEPT VISIT LOW MDM: CPT

## 2021-10-26 PROCEDURE — 93005 ELECTROCARDIOGRAM TRACING: CPT | Performed by: EMERGENCY MEDICINE

## 2021-10-26 RX ORDER — ASPIRIN 81 MG/1
324 TABLET, CHEWABLE ORAL ONCE
Status: COMPLETED | OUTPATIENT
Start: 2021-10-26 | End: 2021-10-27

## 2021-10-26 RX ORDER — SODIUM CHLORIDE 0.9 % (FLUSH) 0.9 %
10 SYRINGE (ML) INJECTION AS NEEDED
Status: DISCONTINUED | OUTPATIENT
Start: 2021-10-26 | End: 2021-10-27 | Stop reason: HOSPADM

## 2021-10-27 ENCOUNTER — APPOINTMENT (OUTPATIENT)
Dept: GENERAL RADIOLOGY | Facility: HOSPITAL | Age: 36
End: 2021-10-27

## 2021-10-27 VITALS
DIASTOLIC BLOOD PRESSURE: 83 MMHG | TEMPERATURE: 98 F | SYSTOLIC BLOOD PRESSURE: 129 MMHG | HEART RATE: 91 BPM | OXYGEN SATURATION: 96 % | RESPIRATION RATE: 18 BRPM | HEIGHT: 71 IN | WEIGHT: 229.28 LBS | BODY MASS INDEX: 32.1 KG/M2

## 2021-10-27 LAB
ALBUMIN SERPL-MCNC: 4.7 G/DL (ref 3.5–5.2)
ALBUMIN/GLOB SERPL: 2.1 G/DL
ALP SERPL-CCNC: 127 U/L (ref 39–117)
ALT SERPL W P-5'-P-CCNC: 30 U/L (ref 1–41)
ANION GAP SERPL CALCULATED.3IONS-SCNC: 15 MMOL/L (ref 5–15)
AST SERPL-CCNC: 17 U/L (ref 1–40)
BASOPHILS # BLD AUTO: 0.1 10*3/MM3 (ref 0–0.2)
BASOPHILS NFR BLD AUTO: 0.8 % (ref 0–1.5)
BILIRUB SERPL-MCNC: 0.2 MG/DL (ref 0–1.2)
BUN SERPL-MCNC: 8 MG/DL (ref 6–20)
BUN/CREAT SERPL: 10.4 (ref 7–25)
CALCIUM SPEC-SCNC: 8.5 MG/DL (ref 8.6–10.5)
CHLORIDE SERPL-SCNC: 104 MMOL/L (ref 98–107)
CO2 SERPL-SCNC: 22 MMOL/L (ref 22–29)
CREAT SERPL-MCNC: 0.77 MG/DL (ref 0.76–1.27)
D DIMER PPP FEU-MCNC: <0.19 MG/L (FEU) (ref 0–0.59)
DEPRECATED RDW RBC AUTO: 42 FL (ref 37–54)
EOSINOPHIL # BLD AUTO: 0.3 10*3/MM3 (ref 0–0.4)
EOSINOPHIL NFR BLD AUTO: 2.5 % (ref 0.3–6.2)
ERYTHROCYTE [DISTWIDTH] IN BLOOD BY AUTOMATED COUNT: 12.9 % (ref 12.3–15.4)
GFR SERPL CREATININE-BSD FRML MDRD: 113 ML/MIN/1.73
GLOBULIN UR ELPH-MCNC: 2.2 GM/DL
GLUCOSE SERPL-MCNC: 156 MG/DL (ref 65–99)
HCT VFR BLD AUTO: 44.8 % (ref 37.5–51)
HGB BLD-MCNC: 15.6 G/DL (ref 13–17.7)
HOLD SPECIMEN: NORMAL
HOLD SPECIMEN: NORMAL
LYMPHOCYTES # BLD AUTO: 3.2 10*3/MM3 (ref 0.7–3.1)
LYMPHOCYTES NFR BLD AUTO: 31.1 % (ref 19.6–45.3)
MCH RBC QN AUTO: 32.9 PG (ref 26.6–33)
MCHC RBC AUTO-ENTMCNC: 35 G/DL (ref 31.5–35.7)
MCV RBC AUTO: 94.1 FL (ref 79–97)
MONOCYTES # BLD AUTO: 0.8 10*3/MM3 (ref 0.1–0.9)
MONOCYTES NFR BLD AUTO: 7.3 % (ref 5–12)
NEUTROPHILS NFR BLD AUTO: 58.3 % (ref 42.7–76)
NEUTROPHILS NFR BLD AUTO: 6 10*3/MM3 (ref 1.7–7)
NRBC BLD AUTO-RTO: 0 /100 WBC (ref 0–0.2)
PLATELET # BLD AUTO: 313 10*3/MM3 (ref 140–450)
PMV BLD AUTO: 7 FL (ref 6–12)
POTASSIUM SERPL-SCNC: 3.5 MMOL/L (ref 3.5–5.2)
PROT SERPL-MCNC: 6.9 G/DL (ref 6–8.5)
RBC # BLD AUTO: 4.76 10*6/MM3 (ref 4.14–5.8)
SARS-COV-2 RNA PNL SPEC NAA+PROBE: NOT DETECTED
SODIUM SERPL-SCNC: 141 MMOL/L (ref 136–145)
TROPONIN T SERPL-MCNC: <0.01 NG/ML (ref 0–0.03)
WBC # BLD AUTO: 10.3 10*3/MM3 (ref 3.4–10.8)
WHOLE BLOOD HOLD SPECIMEN: NORMAL
WHOLE BLOOD HOLD SPECIMEN: NORMAL

## 2021-10-27 PROCEDURE — U0003 INFECTIOUS AGENT DETECTION BY NUCLEIC ACID (DNA OR RNA); SEVERE ACUTE RESPIRATORY SYNDROME CORONAVIRUS 2 (SARS-COV-2) (CORONAVIRUS DISEASE [COVID-19]), AMPLIFIED PROBE TECHNIQUE, MAKING USE OF HIGH THROUGHPUT TECHNOLOGIES AS DESCRIBED BY CMS-2020-01-R: HCPCS | Performed by: EMERGENCY MEDICINE

## 2021-10-27 PROCEDURE — 84484 ASSAY OF TROPONIN QUANT: CPT | Performed by: EMERGENCY MEDICINE

## 2021-10-27 PROCEDURE — 80053 COMPREHEN METABOLIC PANEL: CPT | Performed by: EMERGENCY MEDICINE

## 2021-10-27 PROCEDURE — 85025 COMPLETE CBC W/AUTO DIFF WBC: CPT | Performed by: EMERGENCY MEDICINE

## 2021-10-27 PROCEDURE — U0005 INFEC AGEN DETEC AMPLI PROBE: HCPCS | Performed by: EMERGENCY MEDICINE

## 2021-10-27 PROCEDURE — 71045 X-RAY EXAM CHEST 1 VIEW: CPT

## 2021-10-27 PROCEDURE — 85379 FIBRIN DEGRADATION QUANT: CPT | Performed by: EMERGENCY MEDICINE

## 2021-10-27 RX ADMIN — ASPIRIN 81 MG CHEWABLE TABLET 324 MG: 81 TABLET CHEWABLE at 00:12

## 2021-10-28 ENCOUNTER — PATIENT OUTREACH (OUTPATIENT)
Dept: CASE MANAGEMENT | Facility: OTHER | Age: 36
End: 2021-10-28

## 2021-10-28 LAB — QT INTERVAL: 333 MS

## 2021-10-28 NOTE — OUTREACH NOTE
Ambulatory Case Management Note      General & Health Literacy Assessment    Questions/Answers      Most Recent Value   Assessment Completed With Children   Type of Residence Private Residence   Home Care Services No   Equiptment Used at Home None   Communication Device Yes   Bed or Wheelchair Confined No   Difficulty Keeping Appointments No   Chronic Pain No        Care Evaluation    Questions/Answers      Most Recent Value   Suggested Appointments Other (See Comment)  [make appt with cardiology]   Annual Wellness Visit:  Care Coordinator Will Schedule   Care Gaps Addressed Flu Shot,  Other (See Comment)  [covid vaccine]   Flu Shot Status Refused   Care Gap Comments covid vaccine- refused   Other Patient Education/Resources  24/7 [facility] Nurse Call Line   24/7 Nurse Call Line Education Method Verbal        SDOH updated and reviewed with the patient during this program:     Financial Resource Strain: Low Risk    • Difficulty of Paying Living Expenses: Not hard at all       Food Insecurity: No Food Insecurity   • Worried About Running Out of Food in the Last Year: Never true   • Ran Out of Food in the Last Year: Never true       Transportation Needs: No Transportation Needs   • Lack of Transportation (Medical): No   • Lack of Transportation (Non-Medical): No      Patient Outreach    Pt discharged from [facility] ED on 10/26/21, seen for CP. RN-ACM outreach call made to pt. Explained role of RN-ACM. Pt reports occasional CP, states not occurring as often since ED visit. He reports chronic cough, states d/t smoking. Denies SOA, fever, or other symptoms or concerns.    Reviewed with pt: ED AVS; education provided; medical appointments and recommendations; smoking cessation resources- pt declines; RN-ACM contact information; [facility] 24 hour nurse line number; health maintenance gaps; SDOH. Pt denies any needs. Discussed need for PCP appt/AWV, RN-ACM scheduled with pt while on the phone. Pt reports he plans to call  cardiologist to schedule follow up appt. No questions or concerns per pt. Pt appreciates the phone call and declines need for further calls. Advised pt to call with any needs. Follow up outreach as needed.     Lu Marcial RN  Ambulatory Case Management    10/28/2021, 11:39 EDT

## 2022-06-25 ENCOUNTER — APPOINTMENT (OUTPATIENT)
Dept: GENERAL RADIOLOGY | Facility: HOSPITAL | Age: 37
End: 2022-06-25

## 2022-06-25 ENCOUNTER — HOSPITAL ENCOUNTER (EMERGENCY)
Facility: HOSPITAL | Age: 37
Discharge: HOME | End: 2022-06-25
Attending: EMERGENCY MEDICINE | Admitting: EMERGENCY MEDICINE

## 2022-06-25 VITALS
SYSTOLIC BLOOD PRESSURE: 123 MMHG | WEIGHT: 224.8 LBS | DIASTOLIC BLOOD PRESSURE: 73 MMHG | BODY MASS INDEX: 31.47 KG/M2 | OXYGEN SATURATION: 98 % | HEIGHT: 71 IN | HEART RATE: 105 BPM | TEMPERATURE: 99.1 F | RESPIRATION RATE: 18 BRPM

## 2022-06-25 DIAGNOSIS — R07.9 CHEST PAIN, UNSPECIFIED TYPE: Primary | ICD-10-CM

## 2022-06-25 LAB
ALBUMIN SERPL-MCNC: 4.3 G/DL (ref 3.5–5.2)
ALBUMIN/GLOB SERPL: 1.6 G/DL
ALP SERPL-CCNC: 121 U/L (ref 39–117)
ALT SERPL W P-5'-P-CCNC: 23 U/L (ref 1–41)
ANION GAP SERPL CALCULATED.3IONS-SCNC: 14 MMOL/L (ref 5–15)
AST SERPL-CCNC: 18 U/L (ref 1–40)
BASOPHILS # BLD AUTO: 0.1 10*3/MM3 (ref 0–0.2)
BASOPHILS NFR BLD AUTO: 0.7 % (ref 0–1.5)
BILIRUB SERPL-MCNC: 0.5 MG/DL (ref 0–1.2)
BUN SERPL-MCNC: 8 MG/DL (ref 6–20)
BUN/CREAT SERPL: 9.2 (ref 7–25)
CALCIUM SPEC-SCNC: 8.7 MG/DL (ref 8.6–10.5)
CHLORIDE SERPL-SCNC: 100 MMOL/L (ref 98–107)
CO2 SERPL-SCNC: 22 MMOL/L (ref 22–29)
CREAT SERPL-MCNC: 0.87 MG/DL (ref 0.76–1.27)
DEPRECATED RDW RBC AUTO: 40.3 FL (ref 37–54)
EGFRCR SERPLBLD CKD-EPI 2021: 112.6 ML/MIN/1.73
EOSINOPHIL # BLD AUTO: 0.2 10*3/MM3 (ref 0–0.4)
EOSINOPHIL NFR BLD AUTO: 1.3 % (ref 0.3–6.2)
ERYTHROCYTE [DISTWIDTH] IN BLOOD BY AUTOMATED COUNT: 12.5 % (ref 12.3–15.4)
GLOBULIN UR ELPH-MCNC: 2.7 GM/DL
GLUCOSE SERPL-MCNC: 103 MG/DL (ref 65–99)
HCT VFR BLD AUTO: 42.6 % (ref 37.5–51)
HGB BLD-MCNC: 14.8 G/DL (ref 13–17.7)
LYMPHOCYTES # BLD AUTO: 2 10*3/MM3 (ref 0.7–3.1)
LYMPHOCYTES NFR BLD AUTO: 15.5 % (ref 19.6–45.3)
MCH RBC QN AUTO: 32 PG (ref 26.6–33)
MCHC RBC AUTO-ENTMCNC: 34.8 G/DL (ref 31.5–35.7)
MCV RBC AUTO: 92 FL (ref 79–97)
MONOCYTES # BLD AUTO: 1.1 10*3/MM3 (ref 0.1–0.9)
MONOCYTES NFR BLD AUTO: 8.6 % (ref 5–12)
NEUTROPHILS NFR BLD AUTO: 73.9 % (ref 42.7–76)
NEUTROPHILS NFR BLD AUTO: 9.7 10*3/MM3 (ref 1.7–7)
NRBC BLD AUTO-RTO: 0 /100 WBC (ref 0–0.2)
PLATELET # BLD AUTO: 298 10*3/MM3 (ref 140–450)
PMV BLD AUTO: 6.8 FL (ref 6–12)
POTASSIUM SERPL-SCNC: 3.9 MMOL/L (ref 3.5–5.2)
PROT SERPL-MCNC: 7 G/DL (ref 6–8.5)
RBC # BLD AUTO: 4.63 10*6/MM3 (ref 4.14–5.8)
SODIUM SERPL-SCNC: 136 MMOL/L (ref 136–145)
TROPONIN T SERPL-MCNC: <0.01 NG/ML (ref 0–0.03)
WBC NRBC COR # BLD: 13.1 10*3/MM3 (ref 3.4–10.8)

## 2022-06-25 PROCEDURE — 84484 ASSAY OF TROPONIN QUANT: CPT | Performed by: EMERGENCY MEDICINE

## 2022-06-25 PROCEDURE — 93005 ELECTROCARDIOGRAM TRACING: CPT

## 2022-06-25 PROCEDURE — 99283 EMERGENCY DEPT VISIT LOW MDM: CPT

## 2022-06-25 PROCEDURE — 71045 X-RAY EXAM CHEST 1 VIEW: CPT

## 2022-06-25 PROCEDURE — 85025 COMPLETE CBC W/AUTO DIFF WBC: CPT | Performed by: EMERGENCY MEDICINE

## 2022-06-25 PROCEDURE — 93005 ELECTROCARDIOGRAM TRACING: CPT | Performed by: EMERGENCY MEDICINE

## 2022-06-25 PROCEDURE — 80053 COMPREHEN METABOLIC PANEL: CPT | Performed by: EMERGENCY MEDICINE

## 2022-06-25 NOTE — ED PROVIDER NOTES
Subjective   History of Present Illness  37-year-old male presents with elevated blood pressure.  He had used a wrist cuff and blood pressure showed diastolic of 130 at home tonight.  He states he had some sharp shooting chest pain that went up towards his jaw and around his temples.  He has had problems with jaw pain for months that he takes numerous ibuprofen for.  He reports he has bad teeth.  He has not had new jaw pain tonight.  He has had no fever.  No cough or shortness of breath.  No nausea vomiting.  He does have history of anxiety which is why he has not been to the dentist to have his teeth fixed.  He does not complain of any generalized chest pain no heaviness.  Review of Systems  Negative for fever cough nausea vomiting diarrhea reports he has had some sore throat.  Past Medical History:   Diagnosis Date   • Anxiety        Allergies   Allergen Reactions   • Penicillins Swelling       Past Surgical History:   Procedure Laterality Date   • TESTICLE SURGERY Right        Family History   Problem Relation Age of Onset   • COPD Mother    • Heart failure Mother    • Arthritis Mother    • COPD Father    • Heart failure Father    • Heart disease Sister    Sister had sudden death at age 18 of unknown cause    Social History     Socioeconomic History   • Marital status: Single   Tobacco Use   • Smoking status: Current Every Day Smoker     Packs/day: 1.50   • Smokeless tobacco: Never Used   Substance and Sexual Activity   • Alcohol use: Yes   • Drug use: Never   Only routine medication ibuprofen        Objective   Physical Exam  37-year-old male awake alert.  Generally well-developed well-nourished overweight.  Pupils equal round react light.  Oropharynx he has multiple fillings.  He does have a couple of caries.  Neck supple.  Chest clear equal breath sounds.  Cardiovascular regular rate and rhythm.  Abdomen soft nontender.  Neuro exam without focal findings noted.  Legs without tenderness or edema.  Skin without  rash noted.  Procedures           ED Course      Results for orders placed or performed during the hospital encounter of 06/25/22   Comprehensive Metabolic Panel    Specimen: Arm, Left; Blood   Result Value Ref Range    Glucose 103 (H) 65 - 99 mg/dL    BUN 8 6 - 20 mg/dL    Creatinine 0.87 0.76 - 1.27 mg/dL    Sodium 136 136 - 145 mmol/L    Potassium 3.9 3.5 - 5.2 mmol/L    Chloride 100 98 - 107 mmol/L    CO2 22.0 22.0 - 29.0 mmol/L    Calcium 8.7 8.6 - 10.5 mg/dL    Total Protein 7.0 6.0 - 8.5 g/dL    Albumin 4.30 3.50 - 5.20 g/dL    ALT (SGPT) 23 1 - 41 U/L    AST (SGOT) 18 1 - 40 U/L    Alkaline Phosphatase 121 (H) 39 - 117 U/L    Total Bilirubin 0.5 0.0 - 1.2 mg/dL    Globulin 2.7 gm/dL    A/G Ratio 1.6 g/dL    BUN/Creatinine Ratio 9.2 7.0 - 25.0    Anion Gap 14.0 5.0 - 15.0 mmol/L    eGFR 112.6 >60.0 mL/min/1.73   Troponin    Specimen: Arm, Left; Blood   Result Value Ref Range    Troponin T <0.010 0.000 - 0.030 ng/mL   CBC Auto Differential    Specimen: Arm, Left; Blood   Result Value Ref Range    WBC 13.10 (H) 3.40 - 10.80 10*3/mm3    RBC 4.63 4.14 - 5.80 10*6/mm3    Hemoglobin 14.8 13.0 - 17.7 g/dL    Hematocrit 42.6 37.5 - 51.0 %    MCV 92.0 79.0 - 97.0 fL    MCH 32.0 26.6 - 33.0 pg    MCHC 34.8 31.5 - 35.7 g/dL    RDW 12.5 12.3 - 15.4 %    RDW-SD 40.3 37.0 - 54.0 fl    MPV 6.8 6.0 - 12.0 fL    Platelets 298 140 - 450 10*3/mm3    Neutrophil % 73.9 42.7 - 76.0 %    Lymphocyte % 15.5 (L) 19.6 - 45.3 %    Monocyte % 8.6 5.0 - 12.0 %    Eosinophil % 1.3 0.3 - 6.2 %    Basophil % 0.7 0.0 - 1.5 %    Neutrophils, Absolute 9.70 (H) 1.70 - 7.00 10*3/mm3    Lymphocytes, Absolute 2.00 0.70 - 3.10 10*3/mm3    Monocytes, Absolute 1.10 (H) 0.10 - 0.90 10*3/mm3    Eosinophils, Absolute 0.20 0.00 - 0.40 10*3/mm3    Basophils, Absolute 0.10 0.00 - 0.20 10*3/mm3    nRBC 0.0 0.0 - 0.2 /100 WBC   ECG 12 Lead   Result Value Ref Range    QT Interval 327 ms     No radiology results for the last day  Medications - No data to  "display  /81   Pulse 97   Temp 99.1 °F (37.3 °C) (Oral)   Resp 17   Ht 180.3 cm (71\")   Wt 102 kg (224 lb 12.8 oz)   SpO2 98%   BMI 31.35 kg/m²                                          MDM  Chart review: Patient had evaluation October of last year for chest pain.  He wanted to go home at that time stated would follow-up.  Comorbidity: As per past history  Differential: Musculoskeletal pain, anxiety, angina,  My EKG interpretation: Sinus rhythm rate of 97.  Compared to previous no significant change  Lab: Comprehensive metabolic panel remarkable for glucose of 103 alk phosphatase 121 CBC white count 13.1 73 segs no bands troponin negative  Radiology: I reviewed chest x-ray.  No acute cardiopulmonary abnormality noted  Discussion/treatment: Patient's findings were discussed with him.  Patient has heart score of 1.  He was discharged.  Encouraged to stop smoking.  To follow-up with prime provider, return new or worsening symptoms.  He was also encouraged to follow-up with dentist regarding his teeth.  His blood pressure 117/81 at discharge.  Patient was evaluated using appropriate PPE      Final diagnoses:   Chest pain, unspecified type       ED Disposition  ED Disposition     ED Disposition   Discharge    Condition   Stable    Comment   --             Provider, No Known  [facility] [place] [address]          Kieran Isaacs MD  [address] [facility] [place] [address] [phone]               Medication List      No changes were made to your prescriptions during this visit.          Antwan Marks MD  06/25/22 0158       Antwan Marks MD  06/25/22 0159    "

## 2022-06-25 NOTE — DISCHARGE INSTRUCTIONS
Stop smoking.  Follow-up with primary provider.  You are also given cardiology referral to .  Follow-up with dentist regarding your teeth.  Return new or worsening symptoms

## 2022-06-26 LAB — QT INTERVAL: 327 MS

## 2022-07-05 ENCOUNTER — PATIENT OUTREACH (OUTPATIENT)
Dept: CASE MANAGEMENT | Facility: OTHER | Age: 37
End: 2022-07-05

## 2022-07-05 NOTE — OUTREACH NOTE
"AMBULATORY CASE MANAGEMENT NOTE  Patient Outreach  Name and Relationship of Patient/Support Person: Matt Oconnell R - Self    HRCM outreach completed with Mr Oconnell re 6/25/22 ED visit for DX chest pain.  See flow sheets for additional details.  He states to be compliant with AVS & states he's doing \"pretty good\" now.  States no longer having sharp chest pains and has no c/o f/c, n/v, palps, SOA, dizziness.       States his girlfriend is setting him up with a new PCP, so he declines RN-ACM assistance to schedule new PCP.  States smokes \"at least a pack a day\" because he doesn't use any other substances to relieve anxiety.    Discussed 1-800-QUIT-NOW support system.  Denies immediate issues, needs, questions.  He VU he is strongly encouraged to establish care with new PCP & that he can call RN-ACM for assistance with this.    SDOH updated and reviewed with the patient during this program:  Denies insecurities re food, meds, transport, housing.  has car and drives self.  Financial Resource Strain: Low Risk    • Difficulty of Paying Living Expenses: Not very hard      Food Insecurity: No Food Insecurity   • Worried About Running Out of Food in the Last Year: Never true   • Ran Out of Food in the Last Year: Never true      Transportation Needs: No Transportation Needs   • Lack of Transportation (Medical): No   • Lack of Transportation (Non-Medical): No      Housing Stability: Unknown   • Unable to Pay for Housing in the Last Year: No   • Number of Places Lived in the Last Year: Not on file   • Unstable Housing in the Last Year: No     Send Education  Questions/Answers    Flowsheet Row Most Recent Value   Annual Wellness Visit:  Patient Will Schedule   AWV Materials Send Materials   Other Patient Education/Resources  24/7 [facility] Nurse Call Line, Advanced Care Planning, [place]   24/7 Nurse Call Line Education Method Send Materials  [also texted to patient with RN-ACM contact info]   ACP Education " Method Send Materials   MyChart Education Method --  [active]   Advanced Directives: Send Materials      RN-ACM to outreach as needed.    LYLE RAMIREZ  Ambulatory Case Management  7/5/2022, 17:03 EDT

## 2022-10-17 ENCOUNTER — APPOINTMENT (OUTPATIENT)
Dept: CT IMAGING | Facility: HOSPITAL | Age: 37
End: 2022-10-17

## 2022-10-17 ENCOUNTER — HOSPITAL ENCOUNTER (EMERGENCY)
Facility: HOSPITAL | Age: 37
Discharge: HOME | End: 2022-10-17
Attending: EMERGENCY MEDICINE | Admitting: EMERGENCY MEDICINE

## 2022-10-17 VITALS
HEART RATE: 80 BPM | WEIGHT: 229.5 LBS | OXYGEN SATURATION: 96 % | HEIGHT: 71 IN | DIASTOLIC BLOOD PRESSURE: 84 MMHG | TEMPERATURE: 98 F | SYSTOLIC BLOOD PRESSURE: 125 MMHG | BODY MASS INDEX: 32.13 KG/M2 | RESPIRATION RATE: 16 BRPM

## 2022-10-17 DIAGNOSIS — R51.9 FACIAL PAIN: Primary | ICD-10-CM

## 2022-10-17 LAB
ANION GAP SERPL CALCULATED.3IONS-SCNC: 13 MMOL/L (ref 5–15)
BASOPHILS # BLD AUTO: 0 10*3/MM3 (ref 0–0.2)
BASOPHILS NFR BLD AUTO: 0.4 % (ref 0–1.5)
BUN SERPL-MCNC: 12 MG/DL (ref 6–20)
BUN/CREAT SERPL: 14.3 (ref 7–25)
CALCIUM SPEC-SCNC: 9.2 MG/DL (ref 8.6–10.5)
CHLORIDE SERPL-SCNC: 98 MMOL/L (ref 98–107)
CO2 SERPL-SCNC: 26 MMOL/L (ref 22–29)
CREAT SERPL-MCNC: 0.84 MG/DL (ref 0.76–1.27)
DEPRECATED RDW RBC AUTO: 44.2 FL (ref 37–54)
EGFRCR SERPLBLD CKD-EPI 2021: 113.8 ML/MIN/1.73
EOSINOPHIL # BLD AUTO: 0.3 10*3/MM3 (ref 0–0.4)
EOSINOPHIL NFR BLD AUTO: 3 % (ref 0.3–6.2)
ERYTHROCYTE [DISTWIDTH] IN BLOOD BY AUTOMATED COUNT: 13.2 % (ref 12.3–15.4)
GLUCOSE SERPL-MCNC: 110 MG/DL (ref 65–99)
HCT VFR BLD AUTO: 45.1 % (ref 37.5–51)
HGB BLD-MCNC: 15.7 G/DL (ref 13–17.7)
LYMPHOCYTES # BLD AUTO: 3.2 10*3/MM3 (ref 0.7–3.1)
LYMPHOCYTES NFR BLD AUTO: 33 % (ref 19.6–45.3)
MCH RBC QN AUTO: 33.7 PG (ref 26.6–33)
MCHC RBC AUTO-ENTMCNC: 34.9 G/DL (ref 31.5–35.7)
MCV RBC AUTO: 96.6 FL (ref 79–97)
MONOCYTES # BLD AUTO: 0.8 10*3/MM3 (ref 0.1–0.9)
MONOCYTES NFR BLD AUTO: 8.8 % (ref 5–12)
NEUTROPHILS NFR BLD AUTO: 5.3 10*3/MM3 (ref 1.7–7)
NEUTROPHILS NFR BLD AUTO: 54.8 % (ref 42.7–76)
NRBC BLD AUTO-RTO: 0.1 /100 WBC (ref 0–0.2)
PLATELET # BLD AUTO: 352 10*3/MM3 (ref 140–450)
PMV BLD AUTO: 6.8 FL (ref 6–12)
POTASSIUM SERPL-SCNC: 3.8 MMOL/L (ref 3.5–5.2)
RBC # BLD AUTO: 4.66 10*6/MM3 (ref 4.14–5.8)
SODIUM SERPL-SCNC: 137 MMOL/L (ref 136–145)
WBC NRBC COR # BLD: 9.6 10*3/MM3 (ref 3.4–10.8)

## 2022-10-17 PROCEDURE — 36415 COLL VENOUS BLD VENIPUNCTURE: CPT

## 2022-10-17 PROCEDURE — 80048 BASIC METABOLIC PNL TOTAL CA: CPT | Performed by: EMERGENCY MEDICINE

## 2022-10-17 PROCEDURE — 85025 COMPLETE CBC W/AUTO DIFF WBC: CPT | Performed by: EMERGENCY MEDICINE

## 2022-10-17 PROCEDURE — 99283 EMERGENCY DEPT VISIT LOW MDM: CPT

## 2022-10-17 PROCEDURE — 70486 CT MAXILLOFACIAL W/O DYE: CPT

## 2022-10-17 RX ORDER — ALPRAZOLAM 0.5 MG/1
0.5 TABLET ORAL ONCE
Status: DISCONTINUED | OUTPATIENT
Start: 2022-10-17 | End: 2022-10-17 | Stop reason: HOSPADM

## 2022-10-17 RX ORDER — DOXYCYCLINE 100 MG/1
100 CAPSULE ORAL 2 TIMES DAILY
Qty: 14 CAPSULE | Refills: 0 | Status: SHIPPED | OUTPATIENT
Start: 2022-10-17

## 2022-10-17 RX ADMIN — LIDOCAINE HYDROCHLORIDE: 20 SOLUTION ORAL; TOPICAL at 05:07

## 2022-10-17 NOTE — DISCHARGE INSTRUCTIONS
Follow-up with dentist call today, may also use Tylenol for pain, return new or worsening symptoms.

## 2022-10-17 NOTE — ED PROVIDER NOTES
Subjective   History of Present Illness  37-year-old male presents with complaints of left-sided facial pain.  He states he has had this for the last 1 to 2 months.  He states Advil was helping but not now.  He states if he drinks something cold it seems to help.  He has had nausea.  He has had no fever no cough no shortness of breath.  He describes the pain as moderate constant in nature.  Review of Systems  Negative fever cough shortness of breath vomiting diarrhea  Past Medical History:   Diagnosis Date   • Anxiety        Allergies   Allergen Reactions   • Penicillins Swelling       Past Surgical History:   Procedure Laterality Date   • TESTICLE SURGERY Right        Family History   Problem Relation Age of Onset   • COPD Mother    • Heart failure Mother    • Arthritis Mother    • COPD Father    • Heart failure Father    • Heart disease Sister        Social History     Socioeconomic History   • Marital status: Single   Tobacco Use   • Smoking status: Every Day     Packs/day: 1.50     Types: Cigarettes   • Smokeless tobacco: Never   Substance and Sexual Activity   • Alcohol use: Yes   • Drug use: Never       No routine medications    Objective   Physical Exam  37-year-old male awake alert.  Generally well-developed well-nourished.  Pupils equal round at light.  There is no facial edema.  There is no facial tenderness.  TMs are clear bilaterally.  He complains of pain with percussion of both left upper and lower molar.  He has no trismus.  There is no adenopathy.  Chest clear equal breath sounds.  Cardiovascular rate and rhythm abdomen soft nontender.  Neurologic exam without focal findings noted.  Speech clear he ambulates without difficulty.  Procedures           ED Course      Results for orders placed or performed during the hospital encounter of 10/17/22   Basic Metabolic Panel    Specimen: Blood   Result Value Ref Range    Glucose 110 (H) 65 - 99 mg/dL    BUN 12 6 - 20 mg/dL    Creatinine 0.84 0.76 - 1.27  "mg/dL    Sodium 137 136 - 145 mmol/L    Potassium 3.8 3.5 - 5.2 mmol/L    Chloride 98 98 - 107 mmol/L    CO2 26.0 22.0 - 29.0 mmol/L    Calcium 9.2 8.6 - 10.5 mg/dL    BUN/Creatinine Ratio 14.3 7.0 - 25.0    Anion Gap 13.0 5.0 - 15.0 mmol/L    eGFR 113.8 >60.0 mL/min/1.73   CBC Auto Differential    Specimen: Blood   Result Value Ref Range    WBC 9.60 3.40 - 10.80 10*3/mm3    RBC 4.66 4.14 - 5.80 10*6/mm3    Hemoglobin 15.7 13.0 - 17.7 g/dL    Hematocrit 45.1 37.5 - 51.0 %    MCV 96.6 79.0 - 97.0 fL    MCH 33.7 (H) 26.6 - 33.0 pg    MCHC 34.9 31.5 - 35.7 g/dL    RDW 13.2 12.3 - 15.4 %    RDW-SD 44.2 37.0 - 54.0 fl    MPV 6.8 6.0 - 12.0 fL    Platelets 352 140 - 450 10*3/mm3    Neutrophil % 54.8 42.7 - 76.0 %    Lymphocyte % 33.0 19.6 - 45.3 %    Monocyte % 8.8 5.0 - 12.0 %    Eosinophil % 3.0 0.3 - 6.2 %    Basophil % 0.4 0.0 - 1.5 %    Neutrophils, Absolute 5.30 1.70 - 7.00 10*3/mm3    Lymphocytes, Absolute 3.20 (H) 0.70 - 3.10 10*3/mm3    Monocytes, Absolute 0.80 0.10 - 0.90 10*3/mm3    Eosinophils, Absolute 0.30 0.00 - 0.40 10*3/mm3    Basophils, Absolute 0.00 0.00 - 0.20 10*3/mm3    nRBC 0.1 0.0 - 0.2 /100 WBC     CT Facial Bones Without Contrast    Result Date: 10/17/2022  1.  No acute facial bone fracture. Normal appearance of the mandible, within limitations of motion artifact. Electronically signed by:  Damon Mak M.D.  10/17/2022 3:54 AM    Medications   ALPRAZolam (XANAX) tablet 0.5 mg (0.5 mg Oral Not Given 10/17/22 0537)   dental ball oral suspension with diphenhydrAMINE ( Swish & Spit Given 10/17/22 6969)     /96 (BP Location: Left arm, Patient Position: Sitting)   Pulse 90   Temp 97.2 °F (36.2 °C) (Temporal)   Resp 20   Ht 180.3 cm (71\")   Wt 104 kg (229 lb 8 oz)   SpO2 98%   BMI 32.01 kg/m²                                        MDM  Essentially normal CBC basic metabolic panel normal with glucose of 110  I reviewed facial CT.  No acute abnormality noted.  There is normal appearance of " the mandible.  Soft tissues appear normal.  Patient was given dental balls.  Patient was very anxious during CT was given Xanax 0.5 mg.  Patient's findings were discussed with him.  He was discharged placed on doxycycline due to penicillin allergy..  He was advised to follow-up with dentist for repeat evaluation, return new or worsening symptoms.  He was given diclofenac for pain.  Final diagnoses:   Facial pain       ED Disposition  ED Disposition     ED Disposition   Discharge    Condition   Stable    Comment   --             No follow-up provider specified.       Medication List      New Prescriptions    diclofenac 50 MG EC tablet  Commonly known as: VOLTAREN  Take 1 tablet by mouth 3 (Three) Times a Day.     doxycycline 100 MG capsule  Commonly known as: MONODOX  Take 1 capsule by mouth 2 (Two) Times a Day.           Where to Get Your Medications      These medications were sent to [facility] [id number] - [place], [place] - [address] - [phone]  - [phone] FX  [address] [place] [address]    Phone: [phone]   · diclofenac 50 MG EC tablet  · doxycycline 100 MG capsule          Antwan Marks MD  10/17/22 0662

## 2022-10-21 ENCOUNTER — PATIENT OUTREACH (OUTPATIENT)
Dept: CASE MANAGEMENT | Facility: OTHER | Age: 37
End: 2022-10-21

## 2022-10-21 NOTE — OUTREACH NOTE
"AMBULATORY CASE MANAGEMENT NOTE    Patient Outreach  Name and Relationship of Patient/Support Person: Matt Oconnell R - Self   Mr Gutierrez confirms Jan 6 appt, VU to call at least 24 hrs prior to cxl or reschedule.   Denies other needs at this time, agreeable to continued RN-ACM outreach.    Care Coordination  Name and Relationship of Patient/Support Person: Jermaine [facility]  Ruth at [facility] schedule first available new patient appt w/ male provider Jan 6 @ 9am.     Patient Outreach  Name and Relationship of Patient/Support Person: Matt Oconnell R - Self    RN-ACM initial outreach completed with Mr Oconnell re 10/17/22 ED visit for DX jaw pain.  See flow sheet for additional details.    He states to be compliant w ED AVS, taking doxycycline & diclofenac. States jaw pain is improved but still \"sore\".    States will see dentist in [place] on Monday to f/up.    RN-ACM addressed patient questions - he VU re education given. States he is agreeable to RN-ACM assistance to schedule new PCP appt at either [facility] or [facility], prefers a male provider.     Today no c/o F/C, N/V, chest pain, palps, SOA, dizziness, but  has had longtime chronic problem with swallowing - sometimes causes him to choke.   was offered EGD in past but no-showed to appts d/t anxiety re invasive procedures.  He VU importance of dental f/up, PCP f/up, need possible anti-anxiety tx.  States he wishes to proceed to establish with new PCP & authorizes RN-ACM to schedule.     Adult Patient Profile  Questions/Answers    Flowsheet Row Most Recent Value   Symptoms/Conditions Managed at Home behavioral health, HEENT (head, eyes, ears, nose, throat)   Barriers to Managing Health stress of chronic illness, other (see comments)  [anxiety]   HEENT Symptoms/Conditions pain, tooth problem(s), other (see comments)  [chronic swallowing problem]   Tooth Problems pain  [States taking doxycycline & diclofenac per ED 10/17/22 -has dental appt " 10/24/22.]   HEENT Management Strategies adequate rest, medication therapy   HEENT Self-Management Outcome 2 (bad)   HEENT Comment longtime jaw pain improving on RXs, but requires dental f/up - states has appt Monday. Also states agreeable to new PCP consult re longtime swallowing problem.   Behavioral Health Symptoms/Conditions anxiety   Behavioral Management Strategies coping strategies   Behavioral Health Self-Management Outcome 2 (bad)   Behavioral Health Comment states has no-showed to medical appts in past d/t anxiety.  Has stated he smokes cigarettes to relieve anxiety        SDOH updated and reviewed with the patient during this program:  Denies insecurities re food, meds, transport, housing. States has car and drives self.    Financial Resource Strain: Low Risk    • Difficulty of Paying Living Expenses: Not very hard      Food Insecurity: No Food Insecurity   • Worried About Running Out of Food in the Last Year: Never true   • Ran Out of Food in the Last Year: Never true      Transportation Needs: No Transportation Needs   • Lack of Transportation (Medical): No   • Lack of Transportation (Non-Medical): No      Housing Stability: Low Risk    • Unable to Pay for Housing in the Last Year: No   • Number of Places Lived in the Last Year: 1   • Unstable Housing in the Last Year: No     Send Education  Questions/Answers    Flowsheet Row Most Recent Value   Annual Wellness Visit:  Patient Will Schedule   Other Patient Education/Resources  24/7 [facility] Nurse Call LineOpal, Advanced Care Planning   24/7 Nurse Call Line Education Method Verbal   ACP Education Method Verbal   [place] Education Method Verbal  [active]   Advanced Directives: Not Interested At This Time        LYLE RAMIREZ  Ambulatory Case Management  10/21/2022, 11:01 EDT

## 2022-12-09 ENCOUNTER — PATIENT OUTREACH (OUTPATIENT)
Dept: CASE MANAGEMENT | Facility: OTHER | Age: 37
End: 2022-12-09

## 2022-12-09 NOTE — OUTREACH NOTE
AMBULATORY CASE MANAGEMENT NOTE    Name and Relationship of Patient/Support Person: Matt Oconnell R - Self    RN-ACM f/up HRCM outreach completed with Mr Anish.    See flow sheets details.    He reports pain that prompted October ED visit was related to infected wisdom tooth - pain now completely resolved since extraction & antibiotics.    States has f/u dental appt 12/18/22.    States he still feels a little congested, dry cough, voice hoarse since he had flu last month.  States has never had a flu shot/never had flu, but both GF & mother had it & tested positive this year.      Denies hx COPD or asthma.  States cut back cigarettes to 1 ppd since flu.   Education given re smoking effects & RN-ACM encouraged further cessation and he VU.    He VU that smoking cessation, good hydration and muccinex might help him clear lingering respiratory congestion.      Confirms new PCP appt 1/6/23, states wishes to discuss continued issues with swallowing and has anxiety about choking.  States sometimes won't eat when out d/t fear of choking.  Education given re ways to minimize swallowing problems and he VU.    He verb appreciation for the call & denies questions, needs at this time.    Adult Patient Profile  Questions/Answers    Flowsheet Row Most Recent Value   Symptoms/Conditions Managed at Home HEENT (head, eyes, ears, nose, throat), respiratory   Pain Location throat  [States difficulty swallowing conts, chokes if not careful to cut food into small pieces- feels constriction above stomach, below breast bone - sometimes won't eat out. VU to cont cutting food into small pieces, increase soft & moist foods, drink fluids.]   HEENT Management Strategies medication therapy, routine screening   HEENT Self-Management Outcome 5 (very good)   HEENT Comment States headaches, jaw, neck, chest pain resolved since wisdom tooth pulled & finished antibiotics. States oral surgery area well healed, has f/up dental appt 12/18/22 to  evaluate front teeth.  VU re effects of smoking on oral health. States has cut back from 2 ppd to 1 ppd.  VU he is advised to continue cutting back smoking. States chronic swallowing problem continues.   Respiratory Symptoms/Conditions other (see comments)   Respiratory Self-Management Outcome 3 (uncertain)   Respiratory Comment States he, his GF & his mother all had flu x 2 weeks last month (GF 7 mother tested positive, he did not test but had same sx). States he has some lingering respiratory congestion & hoarseness.  States cut back smoking to 1 ppd from 2 ppd.  He VU re education given.        Send Education  Questions/Answers    Flowsheet Row Most Recent Value   Annual Wellness Visit:  Patient Will Schedule   Other Patient Education/Resources  Substance Abuse  [Also discussed ways to prevent choking d/t swallowing problem]   Substance Abuse Education Method Verbal  [Discussed benefits of smoking cessation or reduction ]        SDOH updated and reviewed with the patient during this program:  No SDOH changes reported.    LYLE RAMIREZ  Ambulatory Case Management  12/9/2022, 11:44 EST

## 2023-01-13 ENCOUNTER — PATIENT OUTREACH (OUTPATIENT)
Dept: CASE MANAGEMENT | Facility: OTHER | Age: 38
End: 2023-01-13
Payer: MEDICARE

## 2023-01-13 NOTE — OUTREACH NOTE
AMBULATORY CASE MANAGEMENT NOTE    Patient Outreach  Name and Relationship of Patient/Support Person: Matt Oconnell R - Self    RN-ACM f/up HRCM outreach completed w/ Mr Anish.  See flow sheets details.    Patient NS for 1/6/23 new PCP appt (confirmed appt on 12/9 & 1/4 but states he had to go out of town for emergent family matter and forgot to call to RS).  He VU he is strongly encouraged to schedule a new PCP appt & asks RN-ACM to text Ernestina Mancusoobs ph # & F Patient Connect phone #.  States will f/up & VU there may be another wait for appt. He VU it is important to call 24 hours in advance to RS/CXL.    He denies new issues, but states chronic swallowing issue while eating conts & he conts to manage it w coping strategies previously discussed.  Still smoking.     Adult Patient Profile  Questions/Answers    Flowsheet Row Most Recent Value   HEENT Symptoms/Conditions other (see comments)  [chronic swallowing problem when eating]   Pain Location throat  [unchanged from last assessment]   Tooth Problems --  [all pain resolved since tooth extraction]   HEENT Comment States swallowing problem when eating conts unchanged, but all pain resolved after tooth extraction.  Conts smoking.   Respiratory Comment Respiratory sx resolved   Behavioral Health Comment No showed for new [facility] Ernestina Sutton PCP appt 1/6/23 despite two confirmations. States he failed to CXL/RX d/t emergent family issue he had to handle. Has previously stated he has no showed to appts in past d/t anxiety.  He VU he is strongly encouraged to RS a new PCP appt & to call to RS if can't attend.  He requests text with office ph # and Patient Connect ph #. Text sent.        Patient denies other questions, needs at this time. Scheduling phone numbers texted to patient as requested.  Agreeable to future RN-ACM outreach - scheduled.    LYLE RAMIREZ  Ambulatory Case Management  1/13/2023, 12:37 EST

## 2023-02-24 ENCOUNTER — PATIENT OUTREACH (OUTPATIENT)
Dept: CASE MANAGEMENT | Facility: OTHER | Age: 38
End: 2023-02-24
Payer: MEDICARE

## 2023-02-24 NOTE — OUTREACH NOTE
AMBULATORY CASE MANAGEMENT NOTE  HRCM UTR, pt NS 1/6/23 new pt appt.  At 1/13/23 outreach, ASHANTI gave pt Ernestina Sutton [facility] ph # & [facility] Patient Connect ph # and pt stated he would f/u re RS  A one time, one-mo f/up HRCM outreach scheduled for LTFU engaged pt.    LYLE RAMIREZ  Ambulatory Case Management  2/24/2023, 12:24 EST

## 2023-03-28 ENCOUNTER — TELEPHONE (OUTPATIENT)
Dept: CASE MANAGEMENT | Facility: OTHER | Age: 38
End: 2023-03-28
Payer: MEDICARE

## 2023-03-28 NOTE — TELEPHONE ENCOUNTER
Final HRCM outreach to previously engaged HRCM patient. UTR & no answer no VM set up. No additional outreach scheduled.

## 2024-06-23 ENCOUNTER — HOSPITAL ENCOUNTER (EMERGENCY)
Facility: HOSPITAL | Age: 39
Discharge: HOME | End: 2024-06-23
Attending: EMERGENCY MEDICINE | Admitting: EMERGENCY MEDICINE
Payer: MEDICARE

## 2024-06-23 VITALS
HEIGHT: 71 IN | OXYGEN SATURATION: 95 % | TEMPERATURE: 98 F | BODY MASS INDEX: 32.31 KG/M2 | WEIGHT: 230.82 LBS | DIASTOLIC BLOOD PRESSURE: 77 MMHG | HEART RATE: 89 BPM | RESPIRATION RATE: 20 BRPM | SYSTOLIC BLOOD PRESSURE: 133 MMHG

## 2024-06-23 DIAGNOSIS — L50.9 HIVES: Primary | ICD-10-CM

## 2024-06-23 PROCEDURE — 25010000002 METHYLPREDNISOLONE PER 125 MG: Performed by: EMERGENCY MEDICINE

## 2024-06-23 PROCEDURE — 99282 EMERGENCY DEPT VISIT SF MDM: CPT

## 2024-06-23 PROCEDURE — 96372 THER/PROPH/DIAG INJ SC/IM: CPT

## 2024-06-23 RX ORDER — METHYLPREDNISOLONE SODIUM SUCCINATE 125 MG/2ML
80 INJECTION, POWDER, LYOPHILIZED, FOR SOLUTION INTRAMUSCULAR; INTRAVENOUS ONCE
Status: COMPLETED | OUTPATIENT
Start: 2024-06-23 | End: 2024-06-23

## 2024-06-23 RX ORDER — PREDNISONE 20 MG/1
20 TABLET ORAL DAILY
Qty: 5 TABLET | Refills: 0 | Status: SHIPPED | OUTPATIENT
Start: 2024-06-23

## 2024-06-23 RX ADMIN — METHYLPREDNISOLONE SODIUM SUCCINATE 80 MG: 125 INJECTION, POWDER, FOR SOLUTION INTRAMUSCULAR; INTRAVENOUS at 05:59
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
## 2024-06-25 ENCOUNTER — APPOINTMENT (OUTPATIENT)
Dept: GENERAL RADIOLOGY | Facility: HOSPITAL | Age: 39
End: 2024-06-25
Payer: MEDICARE

## 2024-06-25 ENCOUNTER — HOSPITAL ENCOUNTER (EMERGENCY)
Facility: HOSPITAL | Age: 39
Discharge: HOME | End: 2024-06-25
Attending: EMERGENCY MEDICINE
Payer: MEDICARE

## 2024-06-25 VITALS
HEART RATE: 81 BPM | WEIGHT: 231.48 LBS | RESPIRATION RATE: 16 BRPM | TEMPERATURE: 98.5 F | BODY MASS INDEX: 32.41 KG/M2 | HEIGHT: 71 IN | DIASTOLIC BLOOD PRESSURE: 82 MMHG | OXYGEN SATURATION: 97 % | SYSTOLIC BLOOD PRESSURE: 119 MMHG

## 2024-06-25 DIAGNOSIS — R09.82 POST-NASAL DRIP: Primary | ICD-10-CM

## 2024-06-25 DIAGNOSIS — R05.9 COUGH, UNSPECIFIED TYPE: ICD-10-CM

## 2024-06-25 PROCEDURE — 71045 X-RAY EXAM CHEST 1 VIEW: CPT

## 2024-06-25 PROCEDURE — 99283 EMERGENCY DEPT VISIT LOW MDM: CPT

## 2024-06-25 NOTE — ED PROVIDER NOTES
Subjective   History of Present Illness  39-year-old male presents for cough.  States been going on for couple months.  Started as a clear snot as he describes it and then developed became yellow then green and back to yellow and back to clear snot.  Seen here couple days ago for a rash and started on steroids.  States still having the cough but now mostly nonproductive.  States he feels like he is choking on the phlegm and he has to cough forcefully to clear his throat to get it moved.  States it is worse in the morning and gets better with a few large coughs to get the phlegm moving.  Review of Systems  See HPI.    Past Medical History:   Diagnosis Date    Anxiety        Allergies   Allergen Reactions    Penicillins Swelling       Past Surgical History:   Procedure Laterality Date    HERNIA REPAIR      TESTICLE SURGERY Right        Family History   Problem Relation Age of Onset    COPD Mother     Heart failure Mother     Arthritis Mother     COPD Father     Heart failure Father     Heart disease Sister        Social History     Socioeconomic History    Marital status: Single   Tobacco Use    Smoking status: Every Day     Current packs/day: 1.00     Average packs/day: 1 pack/day for 27.5 years (27.5 ttl pk-yrs)     Types: Cigarettes     Start date: 1997     Passive exposure: Current    Smokeless tobacco: Never   Vaping Use    Vaping status: Never Used   Substance and Sexual Activity    Alcohol use: Not Currently    Drug use: Never    Sexual activity: Defer           Objective   Physical Exam  No acute distress, no tachypnea increased work of breathing, clear to auscultation bilaterally, cobblestoning posterior oropharynx with mildly erythematous oropharynx and mucous in posterior oropharynx, midline uvula, no mass effect or exudate, Regular rate and rhythm, abdomen soft and nontender, no lower extremity edema  Procedures           ED Course      /82   Pulse 81   Temp 98.5 °F (36.9 °C)   Resp 16   Ht 180.3  "cm (71\")   Wt 105 kg (231 lb 7.7 oz)   SpO2 97%   BMI 32.29 kg/m²   Labs Reviewed - No data to display  Medications - No data to display  No radiology results for the last day                                         Medical Decision Making  Problems Addressed:  Cough, unspecified type: complicated acute illness or injury  Post-nasal drip: complicated acute illness or injury    Amount and/or Complexity of Data Reviewed  Radiology: ordered.    My interpretation of chest x-ray is no focal infiltrate or pneumothorax.  See system for radiology interpretation.    Symptoms very consistent with postnasal drip.  Discussed nasal steroids.  Discussed over-the-counter allergy medications.  Will DC.    Final diagnoses:   Post-nasal drip   Cough, unspecified type       ED Disposition  ED Disposition       ED Disposition   Discharge    Condition   Stable    Comment   --               PATIENT CONNECTION - [facility] [id number]  [phone]  In 3 days           Medication List      No changes were made to your prescriptions during this visit.            David Ngo MD  06/29/24 0157    "